# Patient Record
Sex: MALE | Race: ASIAN | NOT HISPANIC OR LATINO | URBAN - METROPOLITAN AREA
[De-identification: names, ages, dates, MRNs, and addresses within clinical notes are randomized per-mention and may not be internally consistent; named-entity substitution may affect disease eponyms.]

---

## 2019-03-31 ENCOUNTER — EMERGENCY (EMERGENCY)
Facility: HOSPITAL | Age: 44
LOS: 1 days | Discharge: HOME | End: 2019-03-31
Attending: EMERGENCY MEDICINE | Admitting: EMERGENCY MEDICINE
Payer: MEDICAID

## 2019-03-31 VITALS
RESPIRATION RATE: 16 BRPM | TEMPERATURE: 98 F | HEART RATE: 72 BPM | OXYGEN SATURATION: 100 % | DIASTOLIC BLOOD PRESSURE: 84 MMHG | SYSTOLIC BLOOD PRESSURE: 125 MMHG

## 2019-03-31 LAB
ALBUMIN SERPL ELPH-MCNC: 4.5 G/DL — SIGNIFICANT CHANGE UP (ref 3.3–5)
ALP SERPL-CCNC: 50 U/L — SIGNIFICANT CHANGE UP (ref 40–120)
ALT FLD-CCNC: 21 U/L — SIGNIFICANT CHANGE UP (ref 4–41)
ANION GAP SERPL CALC-SCNC: 13 MMO/L — SIGNIFICANT CHANGE UP (ref 7–14)
APTT BLD: 34.6 SEC — SIGNIFICANT CHANGE UP (ref 27.5–36.3)
AST SERPL-CCNC: 23 U/L — SIGNIFICANT CHANGE UP (ref 4–40)
BASOPHILS # BLD AUTO: 0.04 K/UL — SIGNIFICANT CHANGE UP (ref 0–0.2)
BASOPHILS NFR BLD AUTO: 0.5 % — SIGNIFICANT CHANGE UP (ref 0–2)
BILIRUB SERPL-MCNC: 0.6 MG/DL — SIGNIFICANT CHANGE UP (ref 0.2–1.2)
BUN SERPL-MCNC: 14 MG/DL — SIGNIFICANT CHANGE UP (ref 7–23)
CALCIUM SERPL-MCNC: 9.4 MG/DL — SIGNIFICANT CHANGE UP (ref 8.4–10.5)
CHLORIDE SERPL-SCNC: 103 MMOL/L — SIGNIFICANT CHANGE UP (ref 98–107)
CO2 SERPL-SCNC: 22 MMOL/L — SIGNIFICANT CHANGE UP (ref 22–31)
CREAT SERPL-MCNC: 1.17 MG/DL — SIGNIFICANT CHANGE UP (ref 0.5–1.3)
EOSINOPHIL # BLD AUTO: 0.11 K/UL — SIGNIFICANT CHANGE UP (ref 0–0.5)
EOSINOPHIL NFR BLD AUTO: 1.5 % — SIGNIFICANT CHANGE UP (ref 0–6)
GLUCOSE SERPL-MCNC: 105 MG/DL — HIGH (ref 70–99)
HCT VFR BLD CALC: 44.2 % — SIGNIFICANT CHANGE UP (ref 39–50)
HGB BLD-MCNC: 15.1 G/DL — SIGNIFICANT CHANGE UP (ref 13–17)
IMM GRANULOCYTES NFR BLD AUTO: 0.3 % — SIGNIFICANT CHANGE UP (ref 0–1.5)
INR BLD: 1.06 — SIGNIFICANT CHANGE UP (ref 0.88–1.17)
LYMPHOCYTES # BLD AUTO: 1.88 K/UL — SIGNIFICANT CHANGE UP (ref 1–3.3)
LYMPHOCYTES # BLD AUTO: 25.2 % — SIGNIFICANT CHANGE UP (ref 13–44)
MCHC RBC-ENTMCNC: 32.3 PG — SIGNIFICANT CHANGE UP (ref 27–34)
MCHC RBC-ENTMCNC: 34.2 % — SIGNIFICANT CHANGE UP (ref 32–36)
MCV RBC AUTO: 94.6 FL — SIGNIFICANT CHANGE UP (ref 80–100)
MONOCYTES # BLD AUTO: 0.67 K/UL — SIGNIFICANT CHANGE UP (ref 0–0.9)
MONOCYTES NFR BLD AUTO: 9 % — SIGNIFICANT CHANGE UP (ref 2–14)
NEUTROPHILS # BLD AUTO: 4.74 K/UL — SIGNIFICANT CHANGE UP (ref 1.8–7.4)
NEUTROPHILS NFR BLD AUTO: 63.5 % — SIGNIFICANT CHANGE UP (ref 43–77)
NRBC # FLD: 0 K/UL — SIGNIFICANT CHANGE UP (ref 0–0)
PLATELET # BLD AUTO: 259 K/UL — SIGNIFICANT CHANGE UP (ref 150–400)
PMV BLD: 9.8 FL — SIGNIFICANT CHANGE UP (ref 7–13)
POTASSIUM SERPL-MCNC: 4.2 MMOL/L — SIGNIFICANT CHANGE UP (ref 3.5–5.3)
POTASSIUM SERPL-SCNC: 4.2 MMOL/L — SIGNIFICANT CHANGE UP (ref 3.5–5.3)
PROT SERPL-MCNC: 7.4 G/DL — SIGNIFICANT CHANGE UP (ref 6–8.3)
PROTHROM AB SERPL-ACNC: 12.1 SEC — SIGNIFICANT CHANGE UP (ref 9.8–13.1)
RBC # BLD: 4.67 M/UL — SIGNIFICANT CHANGE UP (ref 4.2–5.8)
RBC # FLD: 12.5 % — SIGNIFICANT CHANGE UP (ref 10.3–14.5)
SODIUM SERPL-SCNC: 138 MMOL/L — SIGNIFICANT CHANGE UP (ref 135–145)
TROPONIN T, HIGH SENSITIVITY: 8 NG/L — SIGNIFICANT CHANGE UP (ref ?–14)
TROPONIN T, HIGH SENSITIVITY: < 6 NG/L — SIGNIFICANT CHANGE UP (ref ?–14)
WBC # BLD: 7.46 K/UL — SIGNIFICANT CHANGE UP (ref 3.8–10.5)
WBC # FLD AUTO: 7.46 K/UL — SIGNIFICANT CHANGE UP (ref 3.8–10.5)

## 2019-03-31 PROCEDURE — 71046 X-RAY EXAM CHEST 2 VIEWS: CPT | Mod: 26

## 2019-03-31 PROCEDURE — 93010 ELECTROCARDIOGRAM REPORT: CPT | Mod: 59

## 2019-03-31 PROCEDURE — 99220: CPT | Mod: 25

## 2019-03-31 PROCEDURE — 93010 ELECTROCARDIOGRAM REPORT: CPT | Mod: 77

## 2019-03-31 RX ORDER — ASPIRIN/CALCIUM CARB/MAGNESIUM 324 MG
162 TABLET ORAL ONCE
Qty: 0 | Refills: 0 | Status: COMPLETED | OUTPATIENT
Start: 2019-03-31 | End: 2019-03-31

## 2019-03-31 RX ADMIN — Medication 162 MILLIGRAM(S): at 10:58

## 2019-03-31 NOTE — ED PROVIDER NOTE - CLINICAL SUMMARY MEDICAL DECISION MAKING FREE TEXT BOX
43yo Jamaican male with chest pain and abnormal ekg. No prior ekgs for comparison. Though atypical history, pt's background and abnormal ekg highly concerning for ACS as etiology of pain. Plan for labs and tele admission. 43yo Faroese male with chest pain and abnormal ekg. No prior ekgs for comparison. Though atypical history, pt's background and abnormal ekg highly concerning for ACS as etiology of pain. Plan for labs and tele admission vs cdu.

## 2019-03-31 NOTE — ED PROVIDER NOTE - OBJECTIVE STATEMENT
43yo B[place] M no PMH p/w 3 days waxing and waning L sided chest pain (sharp, nonradiating). No SOB, nonexertional. Has not taken anything for pain. Worse with inspiration or movement. No recent illness, no prior cardiac hx or evaluation. No abd pain/NVD or diaphoresis. No family hx cardiac disease.

## 2019-03-31 NOTE — ED CDU PROVIDER INITIAL DAY NOTE - ATTENDING CONTRIBUTION TO CARE
I performed a face-to-face evaluation of the patient and performed a history and physical examination. I agree with the history and physical examination.    44 M, p/w chest pain at sternal angle w/o fever or IVDA. Worse when rolls over in bed. No exertional CP/N/V/dizzy/sweaty. Lab results unremarkable. Radiology results unremarkable. EKG w/ TWI inf. and lat. Low likelihood CAD is causing his CP. Admit to CDU for serial trop. Will consult w/ Cards re: need for stress or echo in CDU.

## 2019-03-31 NOTE — ED CDU PROVIDER INITIAL DAY NOTE - MEDICAL DECISION MAKING DETAILS
44 year old male with no pertinent PMHx pw nonradiating, left sided chest pain that began last night at rest.  Plan: tele monitor, stress test

## 2019-03-31 NOTE — ED ADULT NURSE NOTE - NSIMPLEMENTINTERV_GEN_ALL_ED
Implemented All Universal Safety Interventions:  [place] to call system. Call bell, personal items and telephone within reach. Instruct patient to call for assistance. Room bathroom lighting operational. Non-slip footwear when patient is off stretcher. Physically safe environment: no spills, clutter or unnecessary equipment. Stretcher in lowest position, wheels locked, appropriate side rails in place.

## 2019-03-31 NOTE — ED CDU PROVIDER INITIAL DAY NOTE - OBJECTIVE STATEMENT
44 year old male with no pertinent PMHx pw nonradiating, left sided chest pain that began last night at rest. Pain is reproducible, worse with movement. No pertinent family hx. No recent exercise/heavy lifting. Denies n/v/f/c, SOB, diaphoresis, lightheadedness, le edema, calf pain, immobilization, recent travel, tobacco use.   In the ED: pt had TWI in v-v6. Trop went from 8 to <6. Sent to the CDU for tele monitoring and stress test in the am.

## 2019-03-31 NOTE — ED CDU PROVIDER INITIAL DAY NOTE - PROGRESS NOTE DETAILS
Dr. Naylor evaluated the patient at the bedside. Recommending to also order echo - interpreting the ekg as possible apical HOCM - low suspicion for ACS.

## 2019-03-31 NOTE — ED PROVIDER NOTE - ATTENDING CONTRIBUTION TO CARE
rena: pmh unremarkable. no meds. no hx cardiac testing. has two day hx of upper chest pain which is constant and worsened by movement of upper body.   SH: from [facility]  exam: . there is reproducible upper chest wall tenderness. exam otherwise unremarkable.   ekg: deep t wave inversions lateral precordials.  troponins pending.   Pt will be hospitalized for cardiac w/u on the basis of risk factors and abnl EKG. ASA given. .

## 2019-03-31 NOTE — ED ADULT NURSE NOTE - OBJECTIVE STATEMENT
Pt rec'd to ED R#11 Aox4, in NAD, c/o midsternal CP with radiation to L side chest x2 days, worse today. States pain is constant, "aching." Denies other associated symptoms including SOb/ N/V/ fevers/ chills/ cough. Denies injury. EKG completed at triage. Placed on continuous cardiac monitoring. respirations appear even and unlabored. Bed in lowest locked position. Call bell within reach.

## 2019-03-31 NOTE — ED ADULT TRIAGE NOTE - CHIEF COMPLAINT QUOTE
l ch pains increasing upon touch since fri,increasing since last pm. denies diff breathing. pain increases mvt,inspiration

## 2019-04-01 VITALS
DIASTOLIC BLOOD PRESSURE: 74 MMHG | OXYGEN SATURATION: 100 % | RESPIRATION RATE: 16 BRPM | SYSTOLIC BLOOD PRESSURE: 118 MMHG | HEART RATE: 60 BPM | TEMPERATURE: 98 F

## 2019-04-01 PROCEDURE — 99217: CPT

## 2019-04-01 PROCEDURE — 93306 TTE W/DOPPLER COMPLETE: CPT | Mod: 26

## 2019-04-01 PROCEDURE — 93018 CV STRESS TEST I&R ONLY: CPT | Mod: GC

## 2019-04-01 PROCEDURE — 78452 HT MUSCLE IMAGE SPECT MULT: CPT | Mod: 26

## 2019-04-01 PROCEDURE — 93016 CV STRESS TEST SUPVJ ONLY: CPT | Mod: GC

## 2019-04-01 NOTE — ED CDU PROVIDER SUBSEQUENT DAY NOTE - PROGRESS NOTE DETAILS
Patient resting comfortably, results of labs and imaging reviewed and discussed with patient. Stress test normal, echo wnl, vitals stable, patient seen and evaluated by Cardiology stable for dc home and outpatient f/u with cardiologist. No acute events in CDU. Pt advised to rest, avoid strenuous activity, continue home medications and f/u with Internal medicine clinic or cardiologist in 1-2 days. Pt understood and agreed with plan. All question and concerns addressed. Strict return instructions given. No complaints noted.

## 2019-04-01 NOTE — ED CDU PROVIDER SUBSEQUENT DAY NOTE - HISTORY
CDU Initial Day Note: 44 year old male with no pertinent PMHx pw nonradiating, left sided chest pain that began last night at rest. Pain is reproducible, worse with movement. No pertinent family hx. No recent exercise/heavy lifting. Denies n/v/f/c, SOB, diaphoresis, lightheadedness, le edema, calf pain, immobilization, recent travel, tobacco use.   In the ED: pt had TWI in v-v6. Trop went from 8 to <6. Sent to the CDU for tele monitoring and stress test in the am.  CDU Subsequent Day Note: LOTTIE Orbegon, Agree w/ above, pt w/ reproducible chest wall tenderness, no c/o chest pain overnight, pending Stress/Echo, will continue tele monitoring.

## 2019-04-01 NOTE — ED CDU PROVIDER DISPOSITION NOTE - PLAN OF CARE
Patient advised to follow up with PRIMARY CARE DOCTOR 1-2 DAYS AND CARDIOLOGIST WITHIN 1 WEEK and told to return to the emergency department immediately for any new or concerning symptoms such as chest pain, shortness of breath, dizziness, weakness OR ANY OTHER COMPLAINTS. Patient agrees with plan.    Stay hydrated, Continue to take home medications   Rest, avoid strenuous activity   Bring paper copies with follow up     Advance activity as tolerated.  Continue all previously prescribed medications as directed unless otherwise instructed.  Follow up with your primary care physician in 48-72 hours- bring copies of your results.  Return to the ER for worsening or persistent symptoms, and/or ANY NEW OR CONCERNING SYMPTOMS. If you have issues obtaining follow up, please call: [phone] to obtain a doctor or specialist who takes your insurance in your area.  You may call [phone] to make an appointment with the internal medicine clinic.

## 2019-04-01 NOTE — CONSULT NOTE ADULT - SUBJECTIVE AND OBJECTIVE BOX
Amari Naylor MD  Interventional Cardiology   [place] Office : [phone] [place] [address] [place]. [address]  Tel:   [place] Office : [address] [facility] [place] [address]  Tel: [phone]  Cell : [phone]    HISTORY OF PRESENT ILLNESS:    43yo Iraqi M no PMH p/w 3 days waxing and waning L sided chest pain (sharp, nonradiating). No SOB, nonexertional. Has not taken anything for pain. Worse with inspiration or movement. No recent illness, no prior cardiac hx or evaluation. No abd pain/NVD or diaphoresis. No family hx cardiac disease.    PAST MEDICAL & SURGICAL HISTORY:  No pertinent past medical history  No significant past surgical history    	    MEDICATIONS:                  FAMILY HISTORY:  No pertinent family history in first degree relatives        Allergies    No Known Allergies    Intolerances    	      PHYSICAL EXAM:  T(C): 36.8 (04-01-19 @ 06:43), Max: 36.8 (03-31-19 @ 22:03)  HR: 52 (04-01-19 @ 06:43) (48 - 64)  BP: 115/71 (04-01-19 @ 06:43) (103/63 - 119/73)  RR: 15 (04-01-19 @ 06:43) (15 - 16)  SpO2: 100% (04-01-19 @ 06:43) (98% - 100%)  Wt(kg): --  I&O's Summary      Appearance: Normal	  HEENT:   Normal oral mucosa, PERRL, EOMI	  Cardiovascular: Normal S1 S2, No JVD, No murmurs, No edema  Respiratory: Lungs clear to auscultation	  Gastrointestinal:  Soft, Non-tender, + BS	  Extremities: Normal range of motion, No clubbing, cyanosis or edema    LABS:	 	    CARDIAC MARKERS:                                  15.1   7.46  )-----------( 259      ( 31 Mar 2019 10:05 )             44.2     03-31    138  |  103  |  14  ----------------------------<  105<H>  4.2   |  22  |  1.17    Ca    9.4      31 Mar 2019 10:05    TPro  7.4  /  Alb  4.5  /  TBili  0.6  /  DBili  x   /  AST  23  /  ALT  21  /  AlkPhos  50  03-31    proBNP:   Lipid Profile:   HgA1c:   TSH:     ASSESSMENT/PLAN:

## 2019-04-01 NOTE — CONSULT NOTE ADULT - ASSESSMENT
EKG: SR Inf-Lat TWI     Echo: < from: Transthoracic Echocardiogram (04.01.19 @ 07:31) >  1. Normal left ventricular internal dimensions and wall  thicknesses. There is no evidence of apical hypertrophy.  2. Normal left ventricular systolic function. No segmental  wall motion abnormalities.  3. Normal left ventricular diastolic function.  4. Normal right ventricular size and function.    < end of copied text >    Assessment and Plan:    1) CP: atypical , EKG with deep lateral TWI, HST -, stress pending if normal ok T/o D/C and F/U as OP     2) Abnormal EKG: concerning for apical HOCM but echo normal     3) DVT PPX ambulation

## 2019-04-01 NOTE — ED CDU PROVIDER DISPOSITION NOTE - NSFOLLOWUPINSTRUCTIONS_ED_ALL_ED_FT
Patient advised to follow up with PRIMARY CARE DOCTOR 1-2 DAYS AND CARDIOLOGIST WITHIN 1 WEEK and told to return to the emergency department immediately for any new or concerning symptoms such as chest pain, shortness of breath, dizziness, weakness OR ANY OTHER COMPLAINTS. Patient agrees with plan.    Stay hydrated, Continue to take home medications   Rest, avoid strenuous activity   Bring paper copies with follow up     Advance activity as tolerated.  Continue all previously prescribed medications as directed unless otherwise instructed.  Follow up with your primary care physician in 48-72 hours- bring copies of your results.  Return to the ER for worsening or persistent symptoms, and/or ANY NEW OR CONCERNING SYMPTOMS. If you have issues obtaining follow up, please call: [phone] to obtain a doctor or specialist who takes your insurance in your area.  You may call [phone] to make an appointment with the internal medicine clinic.

## 2019-04-01 NOTE — ED CDU PROVIDER DISPOSITION NOTE - CLINICAL COURSE
Garsia: 44 yom visiting from [facility], here for 2 months already came to ED yesterday with left sided chest pain without radiation, no sob. started at rest. Pt with flipped T waves inferior lateral on EKG and normal troponin. On exam, stable vitals, clear lungs, +reproducible cp, otherwise normal exam. Placed placed in CDU for nuclear stress and echo and tele monitoring, no pain while here. Stress and echo were both normal. Pt provided copies to take home with him.

## 2019-04-01 NOTE — ED CDU PROVIDER SUBSEQUENT DAY NOTE - ATTENDING CONTRIBUTION TO CARE
Garsia: 44 yom visiting from [facility], here for 2 months already came to ED yesterday with left sided chest pain without radiation, no sob. started at rest. Pt with flipped T waves inferior lateral on EKG and normal troponin. On exam, stable vitals, clear lungs, +reproducible cp, otherwise normal exam. Placed placed in CDU for nuclear stress and echo and tele monitoring, no pain while here.

## 2019-04-01 NOTE — ED CDU PROVIDER DISPOSITION NOTE - CARE PROVIDER_API CALL
Amari Naylor (MD)  Cardiovascular Disease; Nuclear Cardiology  [address] [place] [address]  Phone: [phone]  Fax: [phone]  Follow Up Time: 1-3 Days

## 2024-04-08 NOTE — ED PROVIDER NOTE - CCCP TRG CHIEF CMPLNT
[FreeTextEntry1] : PCP: Dr. Анна Munroe - [phone]  48-year-old male with history of nephrolithiasis.  The patient had been followed previously and recommended definitive stone management but refused because he was asymptomatic.  He had been lost to follow-up since 2019 but recently developed back pain and underwent renal ultrasound on May 24, 2023 demonstrating bilateral nonobstructing intrarenal stones.  He underwent right PCNL with improvement in pain. He is here today to discuss stone pathology and undergo renal US  Renal us demonstrated no right sided stones, stable left intrarenal stones  Stone path: 90% CaOx, 10% CaPhos  He is here today to discuss staged left sided procedure. Denies all urinary symptoms    ch pains

## 2025-01-30 NOTE — ED PROVIDER NOTE - TEMPLATE, MLM
Chronic, stable, improving. POCT A1c 7.2 today   Continue jardiance 10 mg daily   Work on diet/ exercise     Orders:    POCT glycosylated hemoglobin (Hb A1C)    Albumin/Creatinine Ratio, Urine; Future     General